# Patient Record
Sex: FEMALE | Race: BLACK OR AFRICAN AMERICAN
[De-identification: names, ages, dates, MRNs, and addresses within clinical notes are randomized per-mention and may not be internally consistent; named-entity substitution may affect disease eponyms.]

---

## 2019-07-10 ENCOUNTER — HOSPITAL ENCOUNTER (EMERGENCY)
Dept: HOSPITAL 74 - JER | Age: 29
Discharge: HOME | End: 2019-07-10
Payer: SELF-PAY

## 2019-07-10 VITALS — SYSTOLIC BLOOD PRESSURE: 117 MMHG | TEMPERATURE: 97.7 F | HEART RATE: 79 BPM | DIASTOLIC BLOOD PRESSURE: 73 MMHG

## 2019-07-10 VITALS — BODY MASS INDEX: 25 KG/M2

## 2019-07-10 DIAGNOSIS — R10.9: ICD-10-CM

## 2019-07-10 DIAGNOSIS — Z3A.12: ICD-10-CM

## 2019-07-10 DIAGNOSIS — N30.00: ICD-10-CM

## 2019-07-10 DIAGNOSIS — O26.891: Primary | ICD-10-CM

## 2019-07-10 LAB
ALBUMIN SERPL-MCNC: 4.2 G/DL (ref 3.4–5)
ALP SERPL-CCNC: 83 U/L (ref 45–117)
ALT SERPL-CCNC: 20 U/L (ref 13–61)
ANION GAP SERPL CALC-SCNC: 4 MMOL/L (ref 8–16)
APPEARANCE UR: CLEAR
AST SERPL-CCNC: 10 U/L (ref 15–37)
BACTERIA # UR AUTO: 259.3 /HPF
BASOPHILS # BLD: 0.3 % (ref 0–2)
BILIRUB SERPL-MCNC: 0.5 MG/DL (ref 0.2–1)
BILIRUB UR STRIP.AUTO-MCNC: NEGATIVE MG/DL
BUN SERPL-MCNC: 8.6 MG/DL (ref 7–18)
CALCIUM SERPL-MCNC: 9.3 MG/DL (ref 8.5–10.1)
CASTS URNS QL MICRO: 3 /LPF (ref 0–8)
CHLORIDE SERPL-SCNC: 105 MMOL/L (ref 98–107)
CO2 SERPL-SCNC: 30 MMOL/L (ref 21–32)
COLOR UR: YELLOW
CREAT SERPL-MCNC: 0.8 MG/DL (ref 0.55–1.3)
DEPRECATED RDW RBC AUTO: 13 % (ref 11.6–15.6)
EOSINOPHIL # BLD: 1.9 % (ref 0–4.5)
EPITH CASTS URNS QL MICRO: 3.7 /HPF
GLUCOSE SERPL-MCNC: 82 MG/DL (ref 74–106)
HCT VFR BLD CALC: 37.3 % (ref 32.4–45.2)
HGB BLD-MCNC: 12.6 GM/DL (ref 10.7–15.3)
KETONES UR QL STRIP: NEGATIVE
LEUKOCYTE ESTERASE UR QL STRIP.AUTO: (no result)
LIPASE SERPL-CCNC: 106 U/L (ref 73–393)
LYMPHOCYTES # BLD: 23.8 % (ref 8–40)
MCH RBC QN AUTO: 30.7 PG (ref 25.7–33.7)
MCHC RBC AUTO-ENTMCNC: 33.8 G/DL (ref 32–36)
MCV RBC: 90.8 FL (ref 80–96)
MONOCYTES # BLD AUTO: 8.3 % (ref 3.8–10.2)
NEUTROPHILS # BLD: 65.7 % (ref 42.8–82.8)
NITRITE UR QL STRIP: NEGATIVE
PH UR: 6.5 [PH] (ref 5–8)
PLATELET # BLD AUTO: 265 K/MM3 (ref 134–434)
PMV BLD: 9.1 FL (ref 7.5–11.1)
POTASSIUM SERPLBLD-SCNC: 4.4 MMOL/L (ref 3.5–5.1)
PROT SERPL-MCNC: 7.9 G/DL (ref 6.4–8.2)
PROT UR QL STRIP: NEGATIVE
PROT UR QL STRIP: NEGATIVE
RBC # BLD AUTO: 1 /HPF (ref 0–4)
RBC # BLD AUTO: 4.1 M/MM3 (ref 3.6–5.2)
SODIUM SERPL-SCNC: 139 MMOL/L (ref 136–145)
SP GR UR: 1.03 (ref 1.01–1.03)
UROBILINOGEN UR STRIP-MCNC: 1 MG/DL (ref 0.2–1)
WBC # BLD AUTO: 8.6 K/MM3 (ref 4–10)
WBC # UR AUTO: 4 /HPF (ref 0–5)

## 2019-07-10 NOTE — PDOC
History of Present Illness





- General


Chief Complaint: Pain, Acute


Stated Complaint: 13 WK PREG / R/O MISCARRIAGE


Time Seen by Provider: 07/10/19 12:41


History Source: Patient


Exam Limitations: Clinical Condition





- History of Present Illness


Initial Comments: 





07/10/19 13:21


Patient with no significant past medical history present with complaint of 

cramping lower abdominal pain for now which has resolved now. Patient reported 

LMP April 25 and started on the OCP birth control 2 months ago and has not seen 

him is his since then. Patient is sexually active with one partner. Denies 

nausea, vomiting, diarrhea or constipation. Patient reported having vaginal 

spotting yesterday which has resolved. Denies any other symptoms. Denies any 

other symptoms.denies urinary frequency, dysuria or burning with urination








Timing/Duration: 4-6 hours





Past History





- Past Medical History


Allergies/Adverse Reactions: 


 Allergies











Allergy/AdvReac Type Severity Reaction Status Date / Time


 


acetaminophen Allergy Unknown  Verified 07/10/19 11:51











Home Medications: 


Ambulatory Orders





Ciprofloxacin [Cipro (Restricted To Id)] 500 mg PO Q12H 7 Days #14 tablet 07/10/

19 











- Suicide/Smoking/Psychosocial Hx


Smoking Status: No


Smoking History: Unknown if ever smoked


Number of Cigarettes Smoked Daily: 0





**Review of Systems





- Review of Systems


Able to Perform ROS?: Yes


Is the patient limited English proficient: No


Constitutional: No: Fever, Malaise, Weakness


HEENTM: No: Symptoms Reported


Respiratory: No: Symptoms reported


Cardiac (ROS): No: Symptoms Reported


ABD/GI: Yes: Abdominal cramping (intermittent abdominal pain).  No: Symptoms 

Reported, See HPI, Nausea, Vomiting


: Yes: Symptoms Reported, Other (vaginal spotting).  No: See HPI, Burning, 

Dysuria, Discharge, Frequency, Urgency


Neurological: No: Symptoms reported, Numbness, Paresthesia, Dizziness


All Other Systems: Reviewed and Negative





*Physical Exam





- Vital Signs


 Last Vital Signs











Temp Pulse Resp BP Pulse Ox


 


 97.8 F   80   18   112/52 L  99 


 


 07/10/19 11:51  07/10/19 11:51  07/10/19 11:51  07/10/19 11:51  07/10/19 11:51














- Physical Exam


Comments: 





07/10/19 13:02


GENERAL:


Well developed, well nourished. Awake and alert. No acute distress.


HEENT:  Normocephalic, atraumatic. PERRLA, EOMI. No conjunctival pallor. Sclera 

are non-icteric. Moist mucous membranes. Oropharynx is clear.


NECK: 


Supple. Full ROM. 


CARDIOVASCULAR:


Regular rate and rhythm. No murmurs, rubs, or gallops. Distal pulses are 2+ and 

symmetric. 


PULMONARY: 


No evidence of respiratory distress. Lungs clear to auscultation bilaterally. 

No wheezing, rales or rhonchi.


ABDOMINAL:


Soft. moderate TTP RLQ suprapic region. Non-distended. No rebound or guarding. 

No organomegaly. Normoactive bowel sounds. 


MUSCULOSKELETAL 


Normal range of motion at all joints. 


SKIN: 


Warm and dry. Normal capillary refill. No cyanosis


NEUROLOGICAL: 


Alert, awake, appropriate.  Gait is normal without ataxia.


PSYCHIATRIC: 


Cooperative. Good eye contact. Appropriate mood 


General Appearance: Yes: Nourished, Appropriately Dressed.  No: Apparent 

Distress


HEENT: positive: Normal ENT Inspection


Neck: positive: Supple


Respiratory/Chest: positive: Lungs Clear, Normal Breath Sounds.  negative: 

Respiratory Distress, Accessory Muscle Use


Cardiovascular: positive: Regular Rhythm, Regular Rate


Female Pelvic Exam: positive: normal external exam, cervical os closed


Gastrointestinal/Abdominal: positive: Normal Bowel Sounds, Tender (mild RLQ/

Pelvic tenderness), Flat, Soft.  negative: Distended, Guarding, Rebound, Hernia

, Hepatomegaly, Spleenomegaly


Musculoskeletal: positive: Normal Inspection.  negative: CVA Tenderness


Extremity: positive: Normal Inspection


Integumentary: positive: Normal Color


Neurologic: positive: Fully Oriented, Alert, Normal Mood/Affect, Normal Response





ED Treatment Course





- LABORATORY


CBC & Chemistry Diagram: 


 07/10/19 13:02





 07/10/19 13:02





Medical Decision Making





- Medical Decision Making





07/10/19 13:22


Patient with no significant past medical history present with complaint of 

cramping lower abdominal pain for now which has resolved now. Patient reported 

LMP April 25 and started on the OCP birth control 2 months ago and has not seen 

him is his since then. Patient is sexually active with one partner. Denies 

nausea, vomiting, diarrhea or constipation. Patient reported having vaginal 

spotting yesterday which has resolved. Denies any other symptoms.denies urinary 

frequency, dysuria or burning with urination


Clinical exam significant for mild right pelvic pain with patient in no acute 

distress now. No active vaginal bleeding now.


Symptoms likely gas pain versus pregnancy versus less likely appendicitis.


CBC, CMP and lipase lab ordered. Urine hCG, UA urine culture lab ordered. 

Pelvic ultrasound ordered to rule out acute pathology. Treat based on lab and 

imaging results


07/10/19 14:32


CBC, CMP and lipase lab are unremarkable .urine hCG negative. UA shows 

leukocytosis with wbc's. Patient pending abdominal pelvic ultrasound.


07/10/19 17:52


abdominal and pelvic ultrasound unremarkable.Patient asymptomatic and stable 

for outpatient tx of UTI with cipro with Gyn follow-up





*DC/Admit/Observation/Transfer


Diagnosis at time of Disposition: 


UTI (urinary tract infection)


Qualifiers:


 Urinary tract infection type: acute cystitis Hematuria presence: without 

hematuria Qualified Code(s): N30.00 - Acute cystitis without hematuria





Abdominal pain


Qualifiers:


 Abdominal location: unspecified location Qualified Code(s): R10.9 - 

Unspecified abdominal pain








- Discharge Dispostion


Disposition: HOME


Condition at time of disposition: Stable


Decision to Admit order: No





- Prescriptions


Prescriptions: 


Ciprofloxacin [Cipro (Restricted To Id)] 500 mg PO Q12H 7 Days #14 tablet





- Referrals


Referrals: 


Aster Velasco MD [Staff Physician] - 





- Patient Instructions


Printed Discharge Instructions:  Acute Cystitis, DI for Acute Cystitis


Additional Instructions: 


Your blood work work was normal. Your abdominal ultrasound and pelvic 

ultrasound was normal. Your urine shows bacteria . Take medications as 

prescribed. Increase fluid intake. Follow-up with GYN





- Post Discharge Activity


Forms/Work/School Notes:  Back to Work, Back to School

## 2023-03-13 ENCOUNTER — HOSPITAL ENCOUNTER (EMERGENCY)
Dept: HOSPITAL 74 - JER | Age: 33
LOS: 1 days | Discharge: HOME | End: 2023-03-14
Payer: COMMERCIAL

## 2023-03-13 VITALS
DIASTOLIC BLOOD PRESSURE: 45 MMHG | SYSTOLIC BLOOD PRESSURE: 115 MMHG | RESPIRATION RATE: 20 BRPM | TEMPERATURE: 97.9 F | HEART RATE: 75 BPM

## 2023-03-13 VITALS — BODY MASS INDEX: 30.5 KG/M2

## 2023-03-13 DIAGNOSIS — R10.2: Primary | ICD-10-CM

## 2023-03-14 LAB
APPEARANCE UR: CLEAR
BILIRUB UR STRIP.AUTO-MCNC: NEGATIVE MG/DL
COLOR UR: YELLOW
KETONES UR QL STRIP: NEGATIVE
LEUKOCYTE ESTERASE UR QL STRIP.AUTO: NEGATIVE
NITRITE UR QL STRIP: NEGATIVE
PH UR: 6 [PH] (ref 5–8)
PROT UR QL STRIP: NEGATIVE
PROT UR QL STRIP: NEGATIVE
SP GR UR: 1.03 (ref 1.01–1.03)
UROBILINOGEN UR STRIP-MCNC: 1 MG/DL (ref 0.2–1)